# Patient Record
Sex: MALE | Race: WHITE
[De-identification: names, ages, dates, MRNs, and addresses within clinical notes are randomized per-mention and may not be internally consistent; named-entity substitution may affect disease eponyms.]

---

## 2021-08-14 ENCOUNTER — APPOINTMENT (OUTPATIENT)
Dept: AFTER HOURS CARE | Facility: EMERGENCY ROOM | Age: 51
End: 2021-08-14
Payer: SELF-PAY

## 2021-08-14 PROBLEM — Z00.00 ENCOUNTER FOR PREVENTIVE HEALTH EXAMINATION: Status: ACTIVE | Noted: 2021-08-14

## 2021-08-14 PROCEDURE — 99203 OFFICE O/P NEW LOW 30 MIN: CPT | Mod: 95

## 2021-08-14 NOTE — PLAN
[FreeTextEntry1] : Increase fluid intake, keep in cool environment.  If symptoms return, may require further evaluation.

## 2021-08-14 NOTE — PHYSICAL EXAM
[No Acute Distress] : no acute distress [Well Nourished] : well nourished [Normal Sclera/Conjunctiva] : normal sclera/conjunctiva [Normal Outer Ear/Nose] : the outer ears and nose were normal in appearance [No Respiratory Distress] : no respiratory distress  [Grossly Normal Strength/Tone] : grossly normal strength/tone [No Rash] : no rash [Normal Gait] : normal gait [Speech Grossly Normal] : speech grossly normal [Normal Insight/Judgement] : insight and judgment were intact

## 2021-08-14 NOTE — HISTORY OF PRESENT ILLNESS
[Home] : at home, [unfilled] , at the time of the visit. [Other Location: e.g. Home (Enter Location, City,State)___] : at [unfilled] [Verbal consent obtained from patient] : the patient, [unfilled] [FreeTextEntry8] : 51M with generalized malaise and fatigue today after spending day at [place] (temp outside 95F+) with report of dark urine and 2 episodes of few drops of blood in urine with slight irritation with urination only, now all symptoms resolved, onset few hours ago.  Had prior kidney stone.  No ab pain, fever, cp, sob, nausea, vomiting, flank pain, dysuria, bleeding from mouth or when brushing teeth, no bruising, feels much better now.

## 2021-08-14 NOTE — REVIEW OF SYSTEMS
[address] Detailed [Fever] : no fever [Chest Pain] : no chest pain [Shortness Of Breath] : no shortness of breath [Abdominal Pain] : no abdominal pain [Back Pain] : no back pain [Skin Rash] : no skin rash [Easy Bleeding] : no easy bleeding [Easy Bruising] : no easy bruising

## 2021-08-14 NOTE — ASSESSMENT
[FreeTextEntry1] : Likely dehydration in setting of heat exposure and possible kidney stone without acute pain.